# Patient Record
Sex: FEMALE | Race: ASIAN | Employment: UNEMPLOYED | URBAN - METROPOLITAN AREA
[De-identification: names, ages, dates, MRNs, and addresses within clinical notes are randomized per-mention and may not be internally consistent; named-entity substitution may affect disease eponyms.]

---

## 2017-01-16 PROBLEM — R63.5 WEIGHT GAIN: Status: ACTIVE | Noted: 2017-01-16

## 2017-01-16 PROBLEM — R10.12 LEFT UPPER QUADRANT PAIN: Status: ACTIVE | Noted: 2017-01-16

## 2017-01-16 PROBLEM — E55.9 VITAMIN D DEFICIENCY: Status: ACTIVE | Noted: 2017-01-16

## 2017-01-17 PROCEDURE — 87338 HPYLORI STOOL AG IA: CPT | Performed by: INTERNAL MEDICINE

## 2018-02-28 PROCEDURE — 83516 IMMUNOASSAY NONANTIBODY: CPT | Performed by: INTERNAL MEDICINE

## 2018-02-28 PROCEDURE — 86200 CCP ANTIBODY: CPT | Performed by: INTERNAL MEDICINE

## 2018-02-28 PROCEDURE — 86235 NUCLEAR ANTIGEN ANTIBODY: CPT | Performed by: INTERNAL MEDICINE

## 2018-02-28 PROCEDURE — 86747 PARVOVIRUS ANTIBODY: CPT | Performed by: INTERNAL MEDICINE

## 2018-02-28 PROCEDURE — 86038 ANTINUCLEAR ANTIBODIES: CPT | Performed by: INTERNAL MEDICINE

## 2018-02-28 PROCEDURE — 86812 HLA TYPING A B OR C: CPT | Performed by: INTERNAL MEDICINE

## 2018-02-28 PROCEDURE — 86060 ANTISTREPTOLYSIN O TITER: CPT | Performed by: INTERNAL MEDICINE

## 2018-03-06 PROBLEM — M02.30 POST-STREPTOCOCCAL REACTIVE ARTHRITIS (HCC): Status: ACTIVE | Noted: 2018-03-06

## 2018-03-06 PROBLEM — B94.8 POST-STREPTOCOCCAL REACTIVE ARTHRITIS (HCC): Status: ACTIVE | Noted: 2018-03-06

## 2018-03-06 PROCEDURE — 81001 URINALYSIS AUTO W/SCOPE: CPT | Performed by: INTERNAL MEDICINE

## 2018-03-06 PROCEDURE — 84156 ASSAY OF PROTEIN URINE: CPT | Performed by: INTERNAL MEDICINE

## 2018-03-06 PROCEDURE — 82570 ASSAY OF URINE CREATININE: CPT | Performed by: INTERNAL MEDICINE

## 2018-03-06 PROCEDURE — 86160 COMPLEMENT ANTIGEN: CPT | Performed by: INTERNAL MEDICINE

## 2018-03-06 PROCEDURE — 36415 COLL VENOUS BLD VENIPUNCTURE: CPT | Performed by: INTERNAL MEDICINE

## 2019-01-16 PROBLEM — R63.5 WEIGHT GAIN: Status: RESOLVED | Noted: 2017-01-16 | Resolved: 2019-01-16

## 2019-01-16 PROBLEM — R10.12 LEFT UPPER QUADRANT PAIN: Status: RESOLVED | Noted: 2017-01-16 | Resolved: 2019-01-16

## 2019-02-05 PROCEDURE — 86480 TB TEST CELL IMMUN MEASURE: CPT | Performed by: NURSE PRACTITIONER

## 2019-02-05 PROCEDURE — 36415 COLL VENOUS BLD VENIPUNCTURE: CPT | Performed by: NURSE PRACTITIONER

## 2019-12-15 ENCOUNTER — HOSPITAL ENCOUNTER (EMERGENCY)
Facility: HOSPITAL | Age: 34
Discharge: HOME | End: 2019-12-16
Attending: EMERGENCY MEDICINE
Payer: COMMERCIAL

## 2019-12-15 DIAGNOSIS — R10.9 ABDOMINAL PAIN OF UNKNOWN ETIOLOGY: Primary | ICD-10-CM

## 2019-12-15 PROCEDURE — 96361 HYDRATE IV INFUSION ADD-ON: CPT

## 2019-12-15 PROCEDURE — 83690 ASSAY OF LIPASE: CPT | Performed by: EMERGENCY MEDICINE

## 2019-12-15 PROCEDURE — 99284 EMERGENCY DEPT VISIT MOD MDM: CPT

## 2019-12-15 PROCEDURE — 80076 HEPATIC FUNCTION PANEL: CPT | Performed by: EMERGENCY MEDICINE

## 2019-12-15 PROCEDURE — 81001 URINALYSIS AUTO W/SCOPE: CPT | Performed by: EMERGENCY MEDICINE

## 2019-12-15 PROCEDURE — 96374 THER/PROPH/DIAG INJ IV PUSH: CPT

## 2019-12-15 PROCEDURE — 96376 TX/PRO/DX INJ SAME DRUG ADON: CPT

## 2019-12-15 PROCEDURE — 85025 COMPLETE CBC W/AUTO DIFF WBC: CPT | Performed by: EMERGENCY MEDICINE

## 2019-12-15 PROCEDURE — 80048 BASIC METABOLIC PNL TOTAL CA: CPT | Performed by: EMERGENCY MEDICINE

## 2019-12-15 RX ORDER — MORPHINE SULFATE 4 MG/ML
4 INJECTION, SOLUTION INTRAMUSCULAR; INTRAVENOUS ONCE
Status: COMPLETED | OUTPATIENT
Start: 2019-12-15 | End: 2019-12-15

## 2019-12-16 ENCOUNTER — APPOINTMENT (OUTPATIENT)
Dept: CT IMAGING | Facility: HOSPITAL | Age: 34
End: 2019-12-16
Attending: EMERGENCY MEDICINE
Payer: COMMERCIAL

## 2019-12-16 VITALS
HEIGHT: 67 IN | WEIGHT: 219 LBS | BODY MASS INDEX: 34.37 KG/M2 | TEMPERATURE: 98 F | RESPIRATION RATE: 20 BRPM | OXYGEN SATURATION: 100 % | DIASTOLIC BLOOD PRESSURE: 99 MMHG | HEART RATE: 80 BPM | SYSTOLIC BLOOD PRESSURE: 140 MMHG

## 2019-12-16 LAB
ALBUMIN SERPL-MCNC: 3.6 G/DL (ref 3.4–5)
ALP LIVER SERPL-CCNC: 76 U/L (ref 37–98)
ALT SERPL-CCNC: 28 U/L (ref 13–56)
ANION GAP SERPL CALC-SCNC: 7 MMOL/L (ref 0–18)
AST SERPL-CCNC: 17 U/L (ref 15–37)
B-HCG UR QL: NEGATIVE
BACTERIA UR QL AUTO: NEGATIVE /HPF
BASOPHILS # BLD AUTO: 0.08 X10(3) UL (ref 0–0.2)
BASOPHILS NFR BLD AUTO: 0.8 %
BILIRUB DIRECT SERPL-MCNC: 0.1 MG/DL (ref 0–0.2)
BILIRUB SERPL-MCNC: 0.3 MG/DL (ref 0.1–2)
BILIRUB UR QL: NEGATIVE
BUN BLD-MCNC: 9 MG/DL (ref 7–18)
BUN/CREAT SERPL: 11.8 (ref 10–20)
CALCIUM BLD-MCNC: 9.6 MG/DL (ref 8.5–10.1)
CHLORIDE SERPL-SCNC: 108 MMOL/L (ref 98–112)
CLARITY UR: CLEAR
CO2 SERPL-SCNC: 26 MMOL/L (ref 21–32)
COLOR UR: YELLOW
CREAT BLD-MCNC: 0.76 MG/DL (ref 0.55–1.02)
DEPRECATED RDW RBC AUTO: 40.1 FL (ref 35.1–46.3)
EOSINOPHIL # BLD AUTO: 0.76 X10(3) UL (ref 0–0.7)
EOSINOPHIL NFR BLD AUTO: 7.4 %
ERYTHROCYTE [DISTWIDTH] IN BLOOD BY AUTOMATED COUNT: 13.3 % (ref 11–15)
GLUCOSE BLD-MCNC: 97 MG/DL (ref 70–99)
GLUCOSE UR-MCNC: NEGATIVE MG/DL
HCT VFR BLD AUTO: 35.7 % (ref 35–48)
HGB BLD-MCNC: 12.2 G/DL (ref 12–16)
IMM GRANULOCYTES # BLD AUTO: 0.02 X10(3) UL (ref 0–1)
IMM GRANULOCYTES NFR BLD: 0.2 %
KETONES UR-MCNC: NEGATIVE MG/DL
LIPASE SERPL-CCNC: 160 U/L (ref 73–393)
LYMPHOCYTES # BLD AUTO: 3.73 X10(3) UL (ref 1–4)
LYMPHOCYTES NFR BLD AUTO: 36.4 %
M PROTEIN MFR SERPL ELPH: 7.6 G/DL (ref 6.4–8.2)
MCH RBC QN AUTO: 28 PG (ref 26–34)
MCHC RBC AUTO-ENTMCNC: 34.2 G/DL (ref 31–37)
MCV RBC AUTO: 81.9 FL (ref 80–100)
MONOCYTES # BLD AUTO: 0.79 X10(3) UL (ref 0.1–1)
MONOCYTES NFR BLD AUTO: 7.7 %
NEUTROPHILS # BLD AUTO: 4.88 X10 (3) UL (ref 1.5–7.7)
NEUTROPHILS # BLD AUTO: 4.88 X10(3) UL (ref 1.5–7.7)
NEUTROPHILS NFR BLD AUTO: 47.5 %
NITRITE UR QL STRIP.AUTO: NEGATIVE
OSMOLALITY SERPL CALC.SUM OF ELEC: 291 MOSM/KG (ref 275–295)
PH UR: 7 [PH] (ref 5–8)
PLATELET # BLD AUTO: 430 10(3)UL (ref 150–450)
POTASSIUM SERPL-SCNC: 3.5 MMOL/L (ref 3.5–5.1)
PROT UR-MCNC: NEGATIVE MG/DL
RBC # BLD AUTO: 4.36 X10(6)UL (ref 3.8–5.3)
RBC #/AREA URNS AUTO: 109 /HPF
SODIUM SERPL-SCNC: 141 MMOL/L (ref 136–145)
SP GR UR STRIP: 1.01 (ref 1–1.03)
UROBILINOGEN UR STRIP-ACNC: <2
WBC # BLD AUTO: 10.3 X10(3) UL (ref 4–11)
WBC #/AREA URNS AUTO: 3 /HPF

## 2019-12-16 PROCEDURE — 74176 CT ABD & PELVIS W/O CONTRAST: CPT | Performed by: EMERGENCY MEDICINE

## 2019-12-16 PROCEDURE — 74174 CTA ABD&PLVS W/CONTRAST: CPT | Performed by: EMERGENCY MEDICINE

## 2019-12-16 PROCEDURE — 81025 URINE PREGNANCY TEST: CPT

## 2019-12-16 RX ORDER — MORPHINE SULFATE 4 MG/ML
4 INJECTION, SOLUTION INTRAMUSCULAR; INTRAVENOUS ONCE
Status: COMPLETED | OUTPATIENT
Start: 2019-12-16 | End: 2019-12-16

## 2019-12-16 NOTE — ED INITIAL ASSESSMENT (HPI)
Patient seen at [address] [place] [address] with kidney stone and sent here for follow up. R sided flank pain since 7 pm without n/v/d, urinary s/s.

## 2019-12-16 NOTE — ED PROVIDER NOTES
Patient Seen in: [facility] AND [facility] Emergency Department      History   Patient presents with:  Abdomen/Flank Pain    Stated Complaint: abd pain    HPI    29 yo with right sided flank pain that is severe and radiating to right abdomen.  No nausea, vomiting General: Bowel sounds are normal. There is no distension. Palpations: Abdomen is soft. There is no mass. Tenderness: There is tenderness. There is right CVA tenderness. There is no guarding or rebound. Musculoskeletal: Normal range of motion. hydronephrosis or nephrolithiasis. No bowel obstruction or inflammation. Appendix is normal. No hydronephrosis or nephrolithiasis. No free air or free fluid.         CTA abdomen and pelvis with IV contrast    IMPRESSION:    The SMA, celiac axis, and DREA are

## 2019-12-16 NOTE — ED NOTES
Patient here from [address] [place] [address] for R flank pain. Patient aware of plan of care.

## 2019-12-16 NOTE — ED NOTES
Patient alert and oriented x 4. Respirations even and unlabored.  Patient care report to [facility]

## 2019-12-17 PROBLEM — R10.84 GENERALIZED ABDOMINAL PAIN: Status: ACTIVE | Noted: 2019-12-17

## 2019-12-17 PROBLEM — K52.9 CHRONIC DIARRHEA: Status: ACTIVE | Noted: 2019-12-17

## 2019-12-17 PROBLEM — E28.2 PCOS (POLYCYSTIC OVARIAN SYNDROME): Status: ACTIVE | Noted: 2019-12-17

## 2019-12-17 PROBLEM — R93.5 ABNORMAL COMPUTED TOMOGRAPHY ANGIOGRAPHY (CTA) OF ABDOMEN: Status: ACTIVE | Noted: 2019-12-17

## 2019-12-17 PROBLEM — E66.9 OBESITY (BMI 35.0-39.9 WITHOUT COMORBIDITY): Status: ACTIVE | Noted: 2019-12-17

## (undated) NOTE — ED AVS SNAPSHOT
Sergio Vaughan   MRN: [id number]    Department:  [facility] Emergency Department   Date of Visit:  12/15/2019           Disclosure     Insurance plans vary and the physician(s) referred by the ER may not be covered by your plan.  Please contact your CARE PHYSICIAN AT ONCE OR RETURN IMMEDIATELY TO THE EMERGENCY DEPARTMENT. If you have been prescribed any medication(s), please fill your prescription right away and begin taking the medication(s) as directed.   If you believe that any of the medications